# Patient Record
Sex: MALE | Race: WHITE
[De-identification: names, ages, dates, MRNs, and addresses within clinical notes are randomized per-mention and may not be internally consistent; named-entity substitution may affect disease eponyms.]

---

## 2020-03-23 ENCOUNTER — APPOINTMENT (OUTPATIENT)
Dept: INTERNAL MEDICINE | Facility: CLINIC | Age: 60
End: 2020-03-23

## 2020-06-29 ENCOUNTER — APPOINTMENT (OUTPATIENT)
Dept: INTERNAL MEDICINE | Facility: CLINIC | Age: 60
End: 2020-06-29
Payer: COMMERCIAL

## 2020-06-29 VITALS
WEIGHT: 193 LBS | HEIGHT: 72 IN | DIASTOLIC BLOOD PRESSURE: 95 MMHG | OXYGEN SATURATION: 97 % | HEART RATE: 104 BPM | SYSTOLIC BLOOD PRESSURE: 160 MMHG | BODY MASS INDEX: 26.14 KG/M2

## 2020-06-29 DIAGNOSIS — K64.9 UNSPECIFIED HEMORRHOIDS: ICD-10-CM

## 2020-06-29 PROCEDURE — 99203 OFFICE O/P NEW LOW 30 MIN: CPT

## 2020-06-29 NOTE — HISTORY OF PRESENT ILLNESS
[de-identified] : This is a 60-year-old male with a history of hypertension for several years. He also has hyperlipidemia. Pessary hernia repair in 2001. Current medications amlodipine 5 mg daily. Allergies none. Social history nonsmoker he drinks 2 glasses of wine per day. Review of systems the patient complains of a dull right-sided low back and flank pain the past one week. He also has a pressure feeling in his anal region. He does have a history of hemorrhoids. [FreeTextEntry1] : New patient evaluation

## 2020-06-29 NOTE — PLAN
[FreeTextEntry1] : Patient is to return for labs this week. He is to return for blood pressure check in 2 months.

## 2020-06-29 NOTE — ASSESSMENT
[FreeTextEntry1] : Patient's blood pressure is quite elevated at 180/90. However patient appears quite anxious. I suspect right lower back pain is muscular in origin. Rectal exam does reveal hemorrhoids. Will treat with Anusol HC suppositories. In terms of the blood pressure will restart amlodipine 5-benazapril 20. Patient is to get labs this week.

## 2020-06-29 NOTE — PHYSICAL EXAM
[No Acute Distress] : no acute distress [Well-Appearing] : well-appearing [Well Nourished] : well nourished [Normal Sclera/Conjunctiva] : normal sclera/conjunctiva [Well Developed] : well developed [PERRL] : pupils equal round and reactive to light [EOMI] : extraocular movements intact [Normal Outer Ear/Nose] : the outer ears and nose were normal in appearance [No JVD] : no jugular venous distention [Normal Oropharynx] : the oropharynx was normal [No Lymphadenopathy] : no lymphadenopathy [Supple] : supple [No Respiratory Distress] : no respiratory distress  [Thyroid Normal, No Nodules] : the thyroid was normal and there were no nodules present [No Accessory Muscle Use] : no accessory muscle use [Clear to Auscultation] : lungs were clear to auscultation bilaterally [Regular Rhythm] : with a regular rhythm [Normal S1, S2] : normal S1 and S2 [Normal Rate] : normal rate  [No Carotid Bruits] : no carotid bruits [No Murmur] : no murmur heard [No Varicosities] : no varicosities [Pedal Pulses Present] : the pedal pulses are present [No Abdominal Bruit] : a ~M bruit was not heard ~T in the abdomen [No Palpable Aorta] : no palpable aorta [No Edema] : there was no peripheral edema [No Extremity Clubbing/Cyanosis] : no extremity clubbing/cyanosis [Non-distended] : non-distended [Soft] : abdomen soft [Non Tender] : non-tender [No HSM] : no HSM [No Masses] : no abdominal mass palpated [Normal Bowel Sounds] : normal bowel sounds [Normal Posterior Cervical Nodes] : no posterior cervical lymphadenopathy [Normal Anterior Cervical Nodes] : no anterior cervical lymphadenopathy [No Joint Swelling] : no joint swelling [No CVA Tenderness] : no CVA  tenderness [No Spinal Tenderness] : no spinal tenderness [Coordination Grossly Intact] : coordination grossly intact [No Rash] : no rash [Grossly Normal Strength/Tone] : grossly normal strength/tone [Normal Gait] : normal gait [No Focal Deficits] : no focal deficits [Normal Affect] : the affect was normal [Normal Insight/Judgement] : insight and judgment were intact [FreeTextEntry1] : ext hemorrhoids with 1+ prostate

## 2020-07-02 ENCOUNTER — APPOINTMENT (OUTPATIENT)
Dept: INTERNAL MEDICINE | Facility: CLINIC | Age: 60
End: 2020-07-02
Payer: COMMERCIAL

## 2020-07-02 DIAGNOSIS — Z00.00 ENCOUNTER FOR GENERAL ADULT MEDICAL EXAMINATION W/OUT ABNORMAL FINDINGS: ICD-10-CM

## 2020-07-02 PROCEDURE — 36415 COLL VENOUS BLD VENIPUNCTURE: CPT

## 2020-07-10 LAB
25(OH)D3 SERPL-MCNC: 37.4 NG/ML
ALBUMIN SERPL ELPH-MCNC: 5.1 G/DL
ALP BLD-CCNC: 70 U/L
ALT SERPL-CCNC: 34 U/L
ANION GAP SERPL CALC-SCNC: 19 MMOL/L
AST SERPL-CCNC: 32 U/L
BASOPHILS # BLD AUTO: 0.04 K/UL
BASOPHILS NFR BLD AUTO: 0.8 %
BILIRUB SERPL-MCNC: 1 MG/DL
BUN SERPL-MCNC: 12 MG/DL
CALCIUM SERPL-MCNC: 9.9 MG/DL
CHLORIDE SERPL-SCNC: 98 MMOL/L
CHOLEST SERPL-MCNC: 270 MG/DL
CHOLEST/HDLC SERPL: 3.4 RATIO
CO2 SERPL-SCNC: 20 MMOL/L
CREAT SERPL-MCNC: 0.94 MG/DL
EOSINOPHIL # BLD AUTO: 0.19 K/UL
EOSINOPHIL NFR BLD AUTO: 3.9 %
ESTIMATED AVERAGE GLUCOSE: 108 MG/DL
GLUCOSE SERPL-MCNC: 86 MG/DL
HBA1C MFR BLD HPLC: 5.4 %
HCT VFR BLD CALC: 50.1 %
HDLC SERPL-MCNC: 81 MG/DL
HGB BLD-MCNC: 15.9 G/DL
IMM GRANULOCYTES NFR BLD AUTO: 0.2 %
LDLC SERPL CALC-MCNC: 168 MG/DL
LYMPHOCYTES # BLD AUTO: 1.61 K/UL
LYMPHOCYTES NFR BLD AUTO: 33.1 %
MAN DIFF?: NORMAL
MCHC RBC-ENTMCNC: 30.7 PG
MCHC RBC-ENTMCNC: 31.7 GM/DL
MCV RBC AUTO: 96.7 FL
MONOCYTES # BLD AUTO: 0.46 K/UL
MONOCYTES NFR BLD AUTO: 9.5 %
NEUTROPHILS # BLD AUTO: 2.55 K/UL
NEUTROPHILS NFR BLD AUTO: 52.5 %
PLATELET # BLD AUTO: 151 K/UL
POTASSIUM SERPL-SCNC: 4.8 MMOL/L
PROT SERPL-MCNC: 7.3 G/DL
PSA SERPL-MCNC: 0.43 NG/ML
RBC # BLD: 5.18 M/UL
RBC # FLD: 13.1 %
SODIUM SERPL-SCNC: 138 MMOL/L
TRIGL SERPL-MCNC: 107 MG/DL
TSH SERPL-ACNC: 1.61 UIU/ML
WBC # FLD AUTO: 4.86 K/UL

## 2020-09-15 ENCOUNTER — APPOINTMENT (OUTPATIENT)
Dept: INTERNAL MEDICINE | Facility: CLINIC | Age: 60
End: 2020-09-15
Payer: COMMERCIAL

## 2020-09-15 VITALS
DIASTOLIC BLOOD PRESSURE: 90 MMHG | SYSTOLIC BLOOD PRESSURE: 150 MMHG | WEIGHT: 190 LBS | HEART RATE: 100 BPM | OXYGEN SATURATION: 98 % | BODY MASS INDEX: 25.73 KG/M2 | HEIGHT: 72 IN

## 2020-09-15 DIAGNOSIS — M25.529 PAIN IN UNSPECIFIED ELBOW: ICD-10-CM

## 2020-09-15 PROCEDURE — 99213 OFFICE O/P EST LOW 20 MIN: CPT

## 2020-09-15 NOTE — PHYSICAL EXAM
[Well Nourished] : well nourished [No Acute Distress] : no acute distress [Well Developed] : well developed [Well-Appearing] : well-appearing [Normal Sclera/Conjunctiva] : normal sclera/conjunctiva [PERRL] : pupils equal round and reactive to light [EOMI] : extraocular movements intact [Normal Oropharynx] : the oropharynx was normal [Normal Outer Ear/Nose] : the outer ears and nose were normal in appearance [No JVD] : no jugular venous distention [No Lymphadenopathy] : no lymphadenopathy [Supple] : supple [Thyroid Normal, No Nodules] : the thyroid was normal and there were no nodules present [No Accessory Muscle Use] : no accessory muscle use [No Respiratory Distress] : no respiratory distress  [Regular Rhythm] : with a regular rhythm [Clear to Auscultation] : lungs were clear to auscultation bilaterally [Normal Rate] : normal rate  [Normal S1, S2] : normal S1 and S2 [No Murmur] : no murmur heard [No Abdominal Bruit] : a ~M bruit was not heard ~T in the abdomen [No Carotid Bruits] : no carotid bruits [Pedal Pulses Present] : the pedal pulses are present [No Varicosities] : no varicosities [No Palpable Aorta] : no palpable aorta [No Edema] : there was no peripheral edema [Soft] : abdomen soft [No Extremity Clubbing/Cyanosis] : no extremity clubbing/cyanosis [Non Tender] : non-tender [No Masses] : no abdominal mass palpated [Non-distended] : non-distended [Normal Bowel Sounds] : normal bowel sounds [No HSM] : no HSM [Normal Anterior Cervical Nodes] : no anterior cervical lymphadenopathy [Normal Posterior Cervical Nodes] : no posterior cervical lymphadenopathy [No CVA Tenderness] : no CVA  tenderness [No Spinal Tenderness] : no spinal tenderness [No Joint Swelling] : no joint swelling [Grossly Normal Strength/Tone] : grossly normal strength/tone [No Rash] : no rash [Coordination Grossly Intact] : coordination grossly intact [No Focal Deficits] : no focal deficits [Normal Gait] : normal gait [Normal Affect] : the affect was normal [Normal Insight/Judgement] : insight and judgment were intact [de-identified] : unable to completely extend l elbow.

## 2020-09-15 NOTE — HISTORY OF PRESENT ILLNESS
[FreeTextEntry1] : Left elbow pain [de-identified] : Patient complains of a two-month history of left elbow pain. The pain started after he was moving heavy furniture mid-July. It is not getting better. Of note he is unable to completely extend the left elbow. Patient has tried Advil relieves the pain but he develops GI discomfort.

## 2020-09-15 NOTE — ASSESSMENT
[FreeTextEntry1] : It is unclear if we are dealing with just tendinitis of the elbow or whether is there is a chip fracture. The patient will be referred to orthopedist for x-rays and evaluation.

## 2021-02-02 ENCOUNTER — TRANSCRIPTION ENCOUNTER (OUTPATIENT)
Age: 61
End: 2021-02-02

## 2021-02-25 ENCOUNTER — TRANSCRIPTION ENCOUNTER (OUTPATIENT)
Age: 61
End: 2021-02-25

## 2022-07-29 ENCOUNTER — APPOINTMENT (OUTPATIENT)
Dept: INTERNAL MEDICINE | Facility: CLINIC | Age: 62
End: 2022-07-29

## 2022-07-29 VITALS
HEIGHT: 72 IN | HEART RATE: 96 BPM | BODY MASS INDEX: 26.14 KG/M2 | TEMPERATURE: 96.6 F | DIASTOLIC BLOOD PRESSURE: 80 MMHG | WEIGHT: 193 LBS | SYSTOLIC BLOOD PRESSURE: 160 MMHG | OXYGEN SATURATION: 98 %

## 2022-07-29 PROCEDURE — 99214 OFFICE O/P EST MOD 30 MIN: CPT

## 2022-07-29 RX ORDER — AMLODIPINE BESYLATE AND BENAZEPRIL HYDROCHLORIDE 5; 20 MG/1; MG/1
5-20 CAPSULE ORAL
Qty: 30 | Refills: 0 | Status: COMPLETED | COMMUNITY
Start: 2021-07-07

## 2022-07-29 NOTE — HISTORY OF PRESENT ILLNESS
[FreeTextEntry1] : Follow up. hypertension [de-identified] : 62-year-old male with a history of hypertension on amlodipine-benazepril 5/20 one daily. His blood pressure at home usually in the 140s over 80. His weight is stable at 195. He feels well. He is not see me in 2 years. There is no active medical problems. He also is on Lipitor 10 mg daily. His blood pressure this morning at home 140/80. Current blood pressure 150/85.

## 2022-07-29 NOTE — ASSESSMENT
[FreeTextEntry1] : Patient's blood pressure currently 150/85. Will increase amlodipine benazepril  combination drug 10-20 once daily. He is advised to obtain fasting labs and return to see me in 2 months.

## 2023-08-22 ENCOUNTER — APPOINTMENT (OUTPATIENT)
Dept: PULMONOLOGY | Facility: CLINIC | Age: 63
End: 2023-08-22

## 2023-08-25 ENCOUNTER — NON-APPOINTMENT (OUTPATIENT)
Age: 63
End: 2023-08-25

## 2023-08-25 ENCOUNTER — APPOINTMENT (OUTPATIENT)
Dept: INTERNAL MEDICINE | Facility: CLINIC | Age: 63
End: 2023-08-25
Payer: COMMERCIAL

## 2023-08-25 VITALS
OXYGEN SATURATION: 98 % | BODY MASS INDEX: 25.65 KG/M2 | DIASTOLIC BLOOD PRESSURE: 84 MMHG | RESPIRATION RATE: 16 BRPM | HEIGHT: 72 IN | HEART RATE: 79 BPM | SYSTOLIC BLOOD PRESSURE: 159 MMHG | WEIGHT: 189.38 LBS

## 2023-08-25 DIAGNOSIS — E78.5 HYPERLIPIDEMIA, UNSPECIFIED: ICD-10-CM

## 2023-08-25 DIAGNOSIS — I10 ESSENTIAL (PRIMARY) HYPERTENSION: ICD-10-CM

## 2023-08-25 DIAGNOSIS — Z00.00 ENCOUNTER FOR GENERAL ADULT MEDICAL EXAMINATION W/OUT ABNORMAL FINDINGS: ICD-10-CM

## 2023-08-25 PROCEDURE — 99213 OFFICE O/P EST LOW 20 MIN: CPT | Mod: 25

## 2023-08-25 PROCEDURE — 36415 COLL VENOUS BLD VENIPUNCTURE: CPT

## 2023-08-25 PROCEDURE — 93000 ELECTROCARDIOGRAM COMPLETE: CPT

## 2023-08-25 PROCEDURE — 99386 PREV VISIT NEW AGE 40-64: CPT | Mod: 25

## 2023-08-25 RX ORDER — ATORVASTATIN CALCIUM 10 MG/1
10 TABLET, FILM COATED ORAL DAILY
Qty: 90 | Refills: 3 | Status: ACTIVE | COMMUNITY
Start: 2020-03-23 | End: 1900-01-01

## 2023-08-25 RX ORDER — AMLODIPINE BESYLATE AND BENAZEPRIL HYDROCHLORIDE 10; 20 MG/1; MG/1
10-20 CAPSULE ORAL
Qty: 90 | Refills: 3 | Status: ACTIVE | COMMUNITY
Start: 2022-07-29 | End: 1900-01-01

## 2023-08-25 NOTE — HISTORY OF PRESENT ILLNESS
[de-identified] : Patient comes to the office for the first time.  Was last seen by physician over a year ago at which time there were no changes to medical care needs. At this time no changes in exertional or functional ability.  He is less active with exercise due to new job. Taking all the same medicines with no changes.  However he ran out of medications recently and has not taken blood pressure medicines for a few days and cholesterol medicines for couple weeks. No acute complaints.

## 2023-08-25 NOTE — HEALTH RISK ASSESSMENT
[Good] : ~his/her~  mood as  good [Yes] : Yes [1 or 2 (0 pts)] : 1 or 2 (0 points) [No falls in past year] : Patient reported no falls in the past year [0] : 1) Little interest or pleasure doing things: Not at all (0) [1] : 2) Feeling down, depressed, or hopeless for several days (1) [PHQ-2 Negative - No further assessment needed] : PHQ-2 Negative - No further assessment needed [With Family] : lives with family [Employed] : employed [] :  [Sexually Active] : sexually active [Reports changes in vision] : Reports changes in vision [Never] : Never [2 - 3 times a week (3 pts)] : 2 - 3  times a week (3 points) [Never (0 pts)] : Never (0 points) [Audit-CScore] : 3 [[id number]] : 1 [HIV test declined] : HIV test declined [Hepatitis C test declined] : Hepatitis C test declined [Change in mental status noted] : No change in mental status noted [Fully functional (bathing, dressing, toileting, transferring, walking, feeding)] : Fully functional (bathing, dressing, toileting, transferring, walking, feeding) [Fully functional (using the telephone, shopping, preparing meals, housekeeping, doing laundry, using] : Fully functional and needs no help or supervision to perform IADLs (using the telephone, shopping, preparing meals, housekeeping, doing laundry, using transportation, managing medications and managing finances) [Reports changes in hearing] : Reports no changes in hearing [Reports changes in dental health] : Reports no changes in dental health [Patient/Caregiver unclear of wishes] : , patient/caregiver unclear of wishes [I will adhere to the patient's wishes.] : I will adhere to the patient's wishes. [Time Spent: ___ minutes] : Time Spent: [unfilled] minutes [AdvancecareDate] : 08/25/23.lw 08/25/23 [FreeTextEntry4] : Discussed with patient.  Suggested the discussion with the healthcare proxy about quality of life issues.  Suggested a discussion on withholding treatment and no further therapy in any given circumstance as determined by the patient.

## 2023-08-25 NOTE — PHYSICAL EXAM
[Normal] : normal gait, coordination grossly intact, no focal deficits [de-identified] : Lipoma base of scalp right side behind ear

## 2023-08-28 LAB
ALBUMIN SERPL ELPH-MCNC: 5.1 G/DL
ALP BLD-CCNC: 81 U/L
ALT SERPL-CCNC: 43 U/L
ANION GAP SERPL CALC-SCNC: 13 MMOL/L
AST SERPL-CCNC: 41 U/L
BILIRUB SERPL-MCNC: 0.8 MG/DL
BUN SERPL-MCNC: 12 MG/DL
CALCIUM SERPL-MCNC: 10.2 MG/DL
CHLORIDE SERPL-SCNC: 101 MMOL/L
CHOLEST SERPL-MCNC: 239 MG/DL
CO2 SERPL-SCNC: 23 MMOL/L
CREAT SERPL-MCNC: 0.8 MG/DL
EGFR: 101 ML/MIN/1.73M2
ESTIMATED AVERAGE GLUCOSE: 111 MG/DL
GLUCOSE SERPL-MCNC: 112 MG/DL
HBA1C MFR BLD HPLC: 5.5 %
HDLC SERPL-MCNC: 85 MG/DL
LDLC SERPL CALC-MCNC: 142 MG/DL
NONHDLC SERPL-MCNC: 154 MG/DL
POTASSIUM SERPL-SCNC: 4.9 MMOL/L
PROT SERPL-MCNC: 7.1 G/DL
PSA SERPL-MCNC: 1.22 NG/ML
SODIUM SERPL-SCNC: 137 MMOL/L
TRIGL SERPL-MCNC: 67 MG/DL
TSH SERPL-ACNC: 1.06 UIU/ML

## 2024-09-17 ENCOUNTER — APPOINTMENT (OUTPATIENT)
Dept: INTERNAL MEDICINE | Facility: CLINIC | Age: 64
End: 2024-09-17
Payer: COMMERCIAL

## 2024-09-17 VITALS
TEMPERATURE: 97.8 F | OXYGEN SATURATION: 96 % | HEIGHT: 72 IN | WEIGHT: 196 LBS | DIASTOLIC BLOOD PRESSURE: 86 MMHG | SYSTOLIC BLOOD PRESSURE: 154 MMHG | RESPIRATION RATE: 16 BRPM | BODY MASS INDEX: 26.55 KG/M2 | HEART RATE: 93 BPM

## 2024-09-17 DIAGNOSIS — Z23 ENCOUNTER FOR IMMUNIZATION: ICD-10-CM

## 2024-09-17 DIAGNOSIS — I10 ESSENTIAL (PRIMARY) HYPERTENSION: ICD-10-CM

## 2024-09-17 DIAGNOSIS — E78.5 HYPERLIPIDEMIA, UNSPECIFIED: ICD-10-CM

## 2024-09-17 PROCEDURE — 99213 OFFICE O/P EST LOW 20 MIN: CPT | Mod: 25

## 2024-09-17 PROCEDURE — 90673 RIV3 VACCINE NO PRESERV IM: CPT

## 2024-09-17 PROCEDURE — G0008: CPT

## 2024-09-17 NOTE — HISTORY OF PRESENT ILLNESS
[de-identified] : Patient comes for follow-up with overall no changes in exertional or functional ability.  Has not taken cholesterol medications in over a week.  Ran out of medication.  Otherwise has no complications with muscle aches or joint pains. Will return for a physical. No acute complaints

## 2024-11-07 ENCOUNTER — APPOINTMENT (OUTPATIENT)
Dept: INTERNAL MEDICINE | Facility: CLINIC | Age: 64
End: 2024-11-07
Payer: COMMERCIAL

## 2024-11-07 ENCOUNTER — NON-APPOINTMENT (OUTPATIENT)
Age: 64
End: 2024-11-07

## 2024-11-07 VITALS
RESPIRATION RATE: 16 BRPM | HEART RATE: 91 BPM | DIASTOLIC BLOOD PRESSURE: 81 MMHG | HEIGHT: 72 IN | BODY MASS INDEX: 26.95 KG/M2 | OXYGEN SATURATION: 96 % | SYSTOLIC BLOOD PRESSURE: 133 MMHG | WEIGHT: 199 LBS

## 2024-11-07 PROCEDURE — 93000 ELECTROCARDIOGRAM COMPLETE: CPT

## 2024-11-07 PROCEDURE — 36415 COLL VENOUS BLD VENIPUNCTURE: CPT

## 2024-11-07 PROCEDURE — 99396 PREV VISIT EST AGE 40-64: CPT

## 2024-11-08 LAB
ALBUMIN SERPL ELPH-MCNC: 5 G/DL
ALP BLD-CCNC: 108 U/L
ALT SERPL-CCNC: 70 U/L
ANION GAP SERPL CALC-SCNC: 15 MMOL/L
AST SERPL-CCNC: 45 U/L
BILIRUB SERPL-MCNC: 0.8 MG/DL
BUN SERPL-MCNC: 14 MG/DL
CALCIUM SERPL-MCNC: 10.1 MG/DL
CHLORIDE SERPL-SCNC: 101 MMOL/L
CHOLEST SERPL-MCNC: 298 MG/DL
CO2 SERPL-SCNC: 23 MMOL/L
CREAT SERPL-MCNC: 0.88 MG/DL
EGFR: 97 ML/MIN/1.73M2
ESTIMATED AVERAGE GLUCOSE: 117 MG/DL
GLUCOSE SERPL-MCNC: 105 MG/DL
HBA1C MFR BLD HPLC: 5.7 %
HCT VFR BLD CALC: 47.7 %
HDLC SERPL-MCNC: 75 MG/DL
HGB BLD-MCNC: 16.1 G/DL
LDLC SERPL CALC-MCNC: 193 MG/DL
MCHC RBC-ENTMCNC: 30.3 PG
MCHC RBC-ENTMCNC: 33.8 G/DL
MCV RBC AUTO: 89.8 FL
NONHDLC SERPL-MCNC: 223 MG/DL
PLATELET # BLD AUTO: NORMAL K/UL
POTASSIUM SERPL-SCNC: 4.7 MMOL/L
PROT SERPL-MCNC: 7.2 G/DL
PSA SERPL-MCNC: 0.41 NG/ML
RBC # BLD: 5.31 M/UL
RBC # FLD: 12.6 %
SODIUM SERPL-SCNC: 139 MMOL/L
TRIGL SERPL-MCNC: 165 MG/DL
TSH SERPL-ACNC: 1.62 UIU/ML
WBC # FLD AUTO: 5.07 K/UL

## 2024-11-11 DIAGNOSIS — Z00.00 ENCOUNTER FOR GENERAL ADULT MEDICAL EXAMINATION W/OUT ABNORMAL FINDINGS: ICD-10-CM
